# Patient Record
Sex: FEMALE | Race: WHITE | NOT HISPANIC OR LATINO | Employment: FULL TIME | RURAL
[De-identification: names, ages, dates, MRNs, and addresses within clinical notes are randomized per-mention and may not be internally consistent; named-entity substitution may affect disease eponyms.]

---

## 2021-01-18 ENCOUNTER — TRANSCRIBE ORDERS (OUTPATIENT)
Dept: PHYSICAL THERAPY | Facility: CLINIC | Age: 36
End: 2021-01-18

## 2021-01-18 DIAGNOSIS — M54.12 RADICULOPATHY, CERVICAL REGION: Primary | ICD-10-CM

## 2021-01-20 ENCOUNTER — TREATMENT (OUTPATIENT)
Dept: PHYSICAL THERAPY | Facility: CLINIC | Age: 36
End: 2021-01-20

## 2021-01-20 DIAGNOSIS — M54.12 RADICULOPATHY, CERVICAL: Primary | ICD-10-CM

## 2021-01-20 DIAGNOSIS — M54.2 PAIN, NECK: ICD-10-CM

## 2021-01-20 PROCEDURE — 97110 THERAPEUTIC EXERCISES: CPT | Performed by: PHYSICAL THERAPIST

## 2021-01-20 PROCEDURE — 97162 PT EVAL MOD COMPLEX 30 MIN: CPT | Performed by: PHYSICAL THERAPIST

## 2021-01-20 NOTE — PROGRESS NOTES
Physical Therapy Initial Evaluation and Plan of Care      Patient: Janet Bailey   : 1985  Diagnosis/ICD-10 Code:  Radiculopathy, cervical [M54.12]  Referring practitioner: Niels Trevizo MD  Date of Initial Visit: 2021  Today's Date: 2021  Patient seen for 1 sessions    Next MD appt: PRN 4-6 weeks if PT doesn't help  Recertification: 02/10/2021.         Subjective Questionnaire: NDI:13/50 = 26%      Subjective Evaluation    History of Present Illness  Onset date: Chronic, 2020 on/off.    Subjective comment: Patient reports it started with pain in her R shoulder and started going in to pain down into her R am and fingers. She reports she has some bone spurs in the shoulder but they were more concerned with the straightening in her spine.  She rpeorts she goes to the chiropractor on/off and it helps sometimes.. She reports radicular pain into the middle and ring fingers. She rpeorts before [facility] ehad a steroid shot and it has helped the numbness.Pain  Current pain ratin  At best pain ratin  At worst pain ratin  Location: R shoulder  Quality: radiating and needle-like (numbness)  Relieving factors: change in position and medications (Biofreeze; IBU)  Aggravating factors: keyboarding and outstretched reach  Progression: no change    Social Support  Lives with: spouse and young children    Hand dominance: right    Diagnostic Tests  MRI studies: abnormal  CT scan: abnormal    Treatments  Previous treatment: chiropractic  Current treatment: injection treatment and medication  Patient Goals  Patient goals for therapy: decreased pain             Objective          Static Posture     Head  Forward and tilted left.    Cervical Spine  Rotated right.    Shoulders  Elevated.    Postural Observations  Seated posture: fair  Standing posture: fair  Correction of posture: makes symptoms better        Palpation   Left   Hypertonic in the suboccipitals and upper trapezius.    Tenderness of the upper trapezius.     Right   Hypertonic in the levator scapulae, suboccipitals and upper trapezius. Tenderness of the levator scapulae and upper trapezius.   Trigger point to upper trapezius.     Tenderness   Cervical Spine   Tenderness in the facet joint.     Additional Tenderness Details  R C4-6 facet joints    Neurological Testing     Sensation   Cervical/Thoracic   Left   Intact: light touch    Right   Intact: light touch  Paresthesia: light touch    Comments   Right light touch: on/off radicular pain to hand and into 3rd and 4th digits..     Active Range of Motion   Cervical/Thoracic Spine   Cervical    Flexion: 45 degrees   Extension: 50 degrees   Left lateral flexion: 22 degrees   Right lateral flexion: 35 degrees with pain  Left rotation: 70 degrees   Right rotation: 55 degrees     Strength/Myotome Testing   Cervical Spine   Neck extension: 4+  Neck flexion: 4+ (pain)    Left   Neck lateral flexion (C3): 4    Right   Neck lateral flexion (C3): 4    Left Shoulder     Planes of Motion   Flexion: 4+   Abduction: 5     Right Shoulder     Planes of Motion   Flexion: 4+   Abduction: 5     Left Elbow   Flexion: 5  Extension: 5    Right Elbow   Flexion: 5  Extension: 5    Tests   Cervical     Left   Negative alar ligament integrity, active compression ([place]), cervical distraction, Spurling's sign and transverse ligament.     Right   Positive active compression ([place]).   Negative alar ligament integrity, cervical distraction, Spurling's sign and transverse ligament.     Additional Tests Details  Negative vertebral artery B  Positive for median nerve tension R>L. R moderate, L mild.    Ambulation     Comments   FWB, non-antalgic gait, no distress, no assistive device, no significant gait deviation, normal arm swing with gait.          Assessment & Plan     Assessment  Impairments: abnormal or restricted ROM, activity intolerance, impaired physical strength, lacks appropriate home exercise  program and pain with function  Assessment details: Patient is a 36 yo female with >6 month onset of R shoulder radicular pain gong down in to he hand. She has significant straightening of her cervical lordosis and stiffness in c-spine with PA and lateral glides, R>L facet joints. She also has significant R sided neural tension and LS tightness. She exhibited some R Rad pain when SB to the R for stretches with overpressure, but it subsided with the release of the overpressure. Patient did well with all HEP exercises today. Patient was given written copies of HEP.  Prognosis: fair  Prognosis details: Barriers to Rehab: Include significant or possible arthritic/degenerative changes that have occurred within the spine, The chronicity of this issue, The patient's obesity, The patient's generally deconditioned state.    Safety Issues: None noted.    Functional Limitations: carrying objects, sleeping, pulling, pushing, uncomfortable because of pain, sitting and reaching overhead  Goals  Plan Goals: Short Term Goals:  1) I with HEP and have additions/changes by next re-certification.    2) Patient to be more aware of posture and posture correction technique.    3) AROM cervical flexion/extension >=55°    4) AROM L cervical SB >= 30°.    5) AROM R cervical ROT >=70°.    6) C-spine >= 4+/5.      Long Term Goals:  1) AROM for the cervical spine all WNL, no increase in pain.    2) B UE 5/5, no increase in pain.    3) C-spine 5/5, no increase in pain.    4) Patient able to perform 10 minutes of scapular stabilization exercises with good posture and no cueing to correct.    5) Patient able to control radicular s/s with there ex.    6) I with final HEP.        Plan  Therapy options: will be seen for skilled physical therapy services  Planned modality interventions: cryotherapy, high voltage pulsed current (pain management) and thermotherapy (hydrocollator packs)  Planned therapy interventions: flexibility, body mechanics  training, home exercise program, joint mobilization, manual therapy, neuromuscular re-education, postural training, soft tissue mobilization, spinal/joint mobilization, strengthening, stretching and therapeutic activities  Duration in visits: 12  Treatment plan discussed with: patient  Plan details: Progress overall ROM, strength, posture, scap stab, neural extensibility, and ergonomics. Please continue manual PA/lateral mobs to the cervical spine and MFR as appropriate.      Other therapeutic activities and/or exercises will be prescribed depending on the patients progress or lack there of.     Visit Diagnoses:    ICD-10-CM ICD-9-CM   1. Radiculopathy, cervical  M54.12 723.4   2. Pain, neck  M54.2 723.1       Timed:  Manual Therapy:   10      mins  48404;  Therapeutic Exercise:    14     mins  43196;     Aquatic Ther Ex:         mins  75093;     Neuromuscular Pina:        mins  47646;    Therapeutic Activity:          mins  40343;     Gait Training:           mins  38953;     Ultrasound:          mins  54335;    Paraffin:        mins  90082;  Electrical Stimulation:         mins  49932 ( );    Untimed:  Electrical Stimulation:         mins  25922 ( );  Mechanical Traction:         mins  40326;     Timed Treatment:   24   mins   Total Treatment:     51   mins    PT SIGNATURE: Cass Dawn, [place] DPT, CSCS   DATE TREATMENT INITIATED: 1/20/2021    Initial Certification  Certification Period: 4/20/2021  I certify that the therapy services are furnished while this patient is under my care.  The services outlined above are required by this patient, and will be reviewed every 90 days.     PHYSICIAN: Niels Trevizo MD      DATE:     Please sign and return via fax to  .. Thank you, [facility].

## 2021-01-28 ENCOUNTER — TREATMENT (OUTPATIENT)
Dept: PHYSICAL THERAPY | Facility: CLINIC | Age: 36
End: 2021-01-28

## 2021-01-28 DIAGNOSIS — M54.12 RADICULOPATHY, CERVICAL: Primary | ICD-10-CM

## 2021-01-28 DIAGNOSIS — M54.2 PAIN, NECK: ICD-10-CM

## 2021-01-28 PROCEDURE — 97140 MANUAL THERAPY 1/> REGIONS: CPT | Performed by: PHYSICAL THERAPIST

## 2021-01-28 PROCEDURE — 97110 THERAPEUTIC EXERCISES: CPT | Performed by: PHYSICAL THERAPIST

## 2021-01-28 NOTE — PROGRESS NOTES
Physical Therapy Daily Progress Note      Patient: Janet Bailey   : 1985  Referring practitioner: Niels Trevizo MD  Date of Initial Visit: Type: THERAPY  Noted: 2021  Today's Date: 2021  Patient seen for 2 sessions    Next MD appt: PRN 4-6 weeks if PT doesn't help  Recertification: 02/10/2021.       Janet Bailey         Subjective Evaluation    History of Present Illness    Subjective comment: patient reports no pain and no rad pain since the evaluation.Pain  Current pain ratin           Objective   See Exercise, Manual, and Modality Logs for complete treatment.       Assessment & Plan     Assessment  Assessment details: Good recall of all HEP exercises. Discussed sitting posture with patient. Added to HEP since patient is only financially able to come 1x/week. Discussed doing stretches daily and then Improved mobility with lateral and PA glides on the R.     Goals  Plan Goals: Short Term Goals:  1) I with HEP and have additions/changes by next re-certification. (met)    2) Patient to be more aware of posture and posture correction technique. (ongoing)    3) AROM cervical flexion/extension >=55°    4) AROM L cervical SB >= 30°.    5) AROM R cervical ROT >=70°.    6) C-spine >= 4+/5.      Long Term Goals:  1) AROM for the cervical spine all WNL, no increase in pain.    2) B UE 5/5, no increase in pain.    3) C-spine 5/5, no increase in pain.    4) Patient able to perform 10 minutes of scapular stabilization exercises with good posture and no cueing to correct.    5) Patient able to control radicular s/s with there ex.    6) I with final HEP.      Plan  Plan details: I; Add clocks next session and possibly cervical isometrics. Continue with manual PA/lateral glides and MFR as indicated.        Visit Diagnoses:    ICD-10-CM ICD-9-CM   1. Radiculopathy, cervical  M54.12 723.4   2. Pain, neck  M54.2 723.1       Progress per Plan of Care           Timed:  Manual Therapy:    10      mins  23552;  Therapeutic Exercise:     33    mins  42558;     Aquatic Ther Ex:         mins  16236;     Neuromuscular Pina:        mins  76066;    Therapeutic Activity:          mins  00772;     Gait Training:           mins  13469;     Ultrasound:          mins  52454;    Paraffin:        mins  10337;  Electrical Stimulation:         mins  94014 ( );    Untimed:  Electrical Stimulation:         mins  53389 ( );  Mechanical Traction:         mins  36930;     Timed Treatment:   43   mins   Total Treatment:     53   mins    Cass Dawn PT DPT, CSCS  Physical Therapist

## 2021-02-03 ENCOUNTER — TREATMENT (OUTPATIENT)
Dept: PHYSICAL THERAPY | Facility: CLINIC | Age: 36
End: 2021-02-03

## 2021-02-03 DIAGNOSIS — M54.2 PAIN, NECK: ICD-10-CM

## 2021-02-03 DIAGNOSIS — M54.12 RADICULOPATHY, CERVICAL: Primary | ICD-10-CM

## 2021-02-03 PROCEDURE — 97110 THERAPEUTIC EXERCISES: CPT | Performed by: PHYSICAL THERAPIST

## 2021-02-03 NOTE — PROGRESS NOTES
Physical Therapy Daily Progress Note    Patient: Janet Bailey   : 1985  Diagnosis/ICD-10 Code:     Diagnosis Plan   1. Radiculopathy, cervical     2. Pain, neck       Referring practitioner: Niels Trevizo MD  Date of Initial Visit: Type: THERAPY  Noted: 2021  Today's Date: 2/3/2021  Patient seen for 3 sessions      PT Recheck Due: 02/10/2021   PT MD Visit: PRN 4-6 weeks if PT does not help       Janet Bailey reports: 80-90% improvement      Subjective Evaluation    History of Present Illness    Subjective comment: Pt states she feels really good today.  No pain to report and no radicular symptoms since last visit.  Pain  Current pain ratin             Objective   See Exercise, Manual, and Modality Logs for complete treatment.       Assessment & Plan     Assessment  Assessment details: Pt pleased with progress in therapy with significant decrease in pain and radicular symptoms.  Good recall of HEP with good form and technique demonstrated.  Pt offers no complaints of pain or symptoms during PT session.      Goals  Plan Goals:  Short Term Goals:  1) I with HEP and have additions/changes by next re-certification. (met)    2) Patient to be more aware of posture and posture correction technique. (ongoing)    3) AROM cervical flexion/extension >=55°    4) AROM L cervical SB >= 30°.    5) AROM R cervical ROT >=70°.    6) C-spine >= 4+/5.      Long Term Goals:  1) AROM for the cervical spine all WNL, no increase in pain.    2) B UE 5/5, no increase in pain.    3) C-spine 5/5, no increase in pain.    4) Patient able to perform 10 minutes of scapular stabilization exercises with good posture and no cueing to correct.    5) Patient able to control radicular s/s with there ex.    6) I with final HEP.    Plan  Plan details: Next visit add cervical isometrics       Progress per Plan of Care and Progress strengthening /stabilization /functional activity            Timed:  Manual Therapy:          mins  46034;  Therapeutic Exercise:    47     mins  68024;   Aquatic Therex :        mins  35693    Neuromuscular Pina:        mins  29074;    Therapeutic Activity:          mins  63915;     Gait Training:           mins  53332;     Ultrasound:          mins  73972;    Electrical Stimulation:         mins  24306 ( );    Untimed:  Electrical Stimulation:         mins  00808 ( );  Mechanical Traction:         mins  00267;   Orthotic fit/train:         mins  16742    Timed Treatment:   47   mins   Total Treatment:     47   mins  Mely Tripp PTA  Physical Therapist Assistant

## 2021-06-29 ENCOUNTER — DOCUMENTATION (OUTPATIENT)
Dept: PHYSICAL THERAPY | Facility: CLINIC | Age: 36
End: 2021-06-29

## 2021-06-29 NOTE — PROGRESS NOTES
Discharge Summary  Discharge Summary from Physical Therapy Report      Number of Visits: 3     Discharge Status of Patient: See PT Note dated 02/03/2021    Goals: All Met    Discharge Plan: Continue with current home exercise program as instructed    Comments Pt improved quickly did not return for further scheduled appointments     Date of Discharge 03/20/2021        Mely Tripp PTA  Physical Therapist          This document has been electronically signed by Mely Tripp PTA on June 29, 2021 13:52 CDT